# Patient Record
Sex: MALE | Race: WHITE | Employment: FULL TIME | URBAN - METROPOLITAN AREA
[De-identification: names, ages, dates, MRNs, and addresses within clinical notes are randomized per-mention and may not be internally consistent; named-entity substitution may affect disease eponyms.]

---

## 2022-02-15 ENCOUNTER — HOSPITAL ENCOUNTER (OUTPATIENT)
Age: 47
Discharge: HOME | End: 2022-02-15

## 2022-02-15 ENCOUNTER — HOSPITAL ENCOUNTER (OUTPATIENT)
Dept: GENERAL RADIOLOGY | Age: 47
Discharge: HOME | End: 2022-02-17

## 2022-02-15 DIAGNOSIS — T14.90XA INJURY: ICD-10-CM

## 2022-02-15 PROCEDURE — 73502 X-RAY EXAM HIP UNI 2-3 VIEWS: CPT

## 2022-03-04 ENCOUNTER — HOSPITAL ENCOUNTER (OUTPATIENT)
Dept: PHYSICAL THERAPY | Facility: CLINIC | Age: 47
Setting detail: THERAPIES SERIES
Discharge: HOME | End: 2022-03-04
Payer: COMMERCIAL

## 2022-03-04 PROCEDURE — 97110 THERAPEUTIC EXERCISES: CPT

## 2022-03-04 PROCEDURE — 97161 PT EVAL LOW COMPLEX 20 MIN: CPT

## 2022-03-04 NOTE — CONSULTS
[] [facility]) - [facility] &  Therapy  [address]  [phone]  F: [phone] [x] [address]  [place] [address]   [address]  P: [phone]  F: [phone] [] [address] &  Therapy  [address]  P: [phone]  F: [phone] [] [address]  P: [phone]  F: [phone] [] [address]  [facility]   [address]   [place]: [phone]  F: [phone]    Physical Therapy Lower Extremity Evaluation    Date:  3/4/2022  Patient: Juanito Hatfield  : 1975  MRN: [id number]  Physician: DAVID Thompson- CNP   Insurance:  (9v approved -3/28)  Medical Diagnosis: S70.02XA - Contusion of left hip, initial encounter  Rehab Codes: M25.55, M62.81, R26.89, M79.652  Onset date: 2/10/22   Next Dr's appt. : 3/15/22    Subjective:   CC/HPI: 42 y.o. male presents to physical therapy with complaint of L hip pain. Pt recalls falling on 2/10/22 while at work. Pt reports he was walking down an icy hill when he slipped and fell onto his L side landing on his work clip. Pt describes soreness afterwards but was able to continue working for 1-2 more days. Pain began gradually worsening. Pain worsens with sitting and with lying on L side especially driving and crossing legs. Pain is described as constant dull pressure that makes him \"nauseous\". Pain lessens upon standing and walking around as well as keeping leg straight Pain initially to L lateral hip, and now is posterior to hip with some burning down his lateral thigh Pt denies history of injury to L hip.  Pt has been performing some exercises, steroids with bad reaction to it so he wasn't able to take it, ibuprofen helps temporary, ice without relief, does not notice a difference with exercises. Pt denies numbness or tingling. Pt denies buckling or instability to L hip. Pt is currently on light duty for work- sitting in a car and crossing legs.      PMHx: [x] Unremarkable    [x] Other: lumbar fracture 2005              [x] Refer to full medical chart  In EPIC     Tests: [x] X-Ray: No acute osseous abnormality of the left hip    [] MRI:    [] Other:     Comorbidities:   [] Obesity [] Dialysis  [x] N/A   [] Asthma/COPD [] Dementia [] Other:   [] Stroke [] Sleep apnea [] Other:   [] Vascular disease [] Rheumatic disease [] Other:       Medications:  [x] Refer to full medical record [] None [] Other:  Allergies:       [] Refer to full medical record [x] None [] Other:    ADL/IADL Previous level of function Current level of function Who currently assists the patient with task Comments    Bathing  [x] Independent  [] Assist [x] Independent  [] Assist     Dress/grooming [x] Independent  [] Assist [x] Independent  [] Assist     Transfer/mobility [x] Independent  [] Assist [x] Independent  [] Assist     Feeding [x] Independent  [] Assist [x] Independent  [] Assist     Toileting [x] Independent  [] Assist [x] Independent  [] Assist     Driving [x] Independent  [] Assist [x] Independent  [] Assist     Housekeeping [x] Independent  [] Assist [x] Modified Independent  [] Assist  Minimizes heavier household chores    Grocery shop/meal prep [x] Independent  [] Assist [x] Independent  [] Assist       Gait Prior level of function Current level of function    [x] Independent  [] Assist [x] Independent  [] Assist   Device: [x] Independent [x] Independent    [] Straight Cane [] Quad cane [] Straight Cane [] Quad cane    [] Standard walker [] Rolling walker   [] 4 wheeled walker [] Standard walker [] Rolling walker   [] 4 wheeled walker    [] Wheelchair [] Wheelchair     Employer [employer]-    Job Status []  Normal duty   [x] Light duty   [] Off due to condition    []  Retired   [] Not employed [] Disability  [] Other:  [x]  Return to work: tbd    Work activities/duties climb ladders, standing, lifting and carrying heavier objects up to 100#   Recreational Activities  N/A         Pain present? Yes   Location Lateral/posterior hip    Pain Rating currently Up to 5/10   Pain at worse 6-7/10   Pain at best 1-2/10   Description of pain Dull, burning    Altered Sensation None    What makes it worse sitting prolonged, driving, heavier lifting    What makes it better Keeping leg straight, standing, walking    Symptom progression Gradually worsening    Sleep Disturbed when lying on L side              Objective:    STRENGTH    Left Right   Hip Flex 4-* 5   Ext 3+* 4+   ABD 3+* 4+   ADD     Knee Flex 4 5   Ext 4- 5   Ankle DF 4+ 5   PF 4+ 5   INV     EVER              ROM  ° A/P    Left Right   Hip Flex 105  57 knee straight  115  Knee bent   65 knee straight   Ext 5 15   ER 18 40   IR 10 35   ABD     ADD     Knee Flex     Ext     Ankle DF     PF     INV     EVER            TESTS (+/-) Left Right Not Tested   Ant. Drawer   [x]   Post. Drawer   [x]   Lachmans   [x]   Valgus Stress   [x]   Varus Stress   [x]   Lauras   [x]   Apleys Comp.    [x]   Apleys Dist.   [x]   Hip Scouring +GT  []   VICENTAs +  []   Piriformis +  []   Patricias -  []   Talor Tilt   [x]   Pat-Fem Grind   [x]       OBSERVATION No Deficit Deficit Not Tested Comments   Posture       Lateral Shift [] [x] [] Shifted towards R- seated and standing   Scoliosis [x] [] []    Iliac Crest [x] [] []    PSIS [x] [] []    ASIS [x] [] []    Genu Valgus [x] [] []    Genu Varus [x] [] []    Genu Recurvatum [x] [] []    Pronation [x] [] []    Supination [x] [] []    Leg Length Discrp [] [x] [] LLE shorter than RLE   Slumped Sitting [x] [] []    Palpation [] [x] [] Increased tenderness to L GT, L glute max and min distal insertion, L piriformis   Sensation [x] [] []    Edema [x] [] []    Neurological [x] [] []    Patellar Mobility [x] [] [x]    Patellar Orientation [] [] [x]    Gait [] [x] [] Analysis: slight antalgic gait pattern, decreased stance time on LLE, lateral shift to RLE         FUNCTION Normal Difficult Unable   Sitting [] [x] []   Standing [x] [] []   Ambulation [x] [] []   Groom/Dress [x] [] []   Lift/Carry [] [x] []   Stairs [] [x] []   Bending [] [x] []   Squat [] [x] []   Kneel [] [x] []         BALANCE/PROPRIOCEPTION              [] Not tested   Single leg stance       R                     L                                PAIN   Eyes open              0            Sec.         0         Sec                 . []    Eyes closed                          Sec. Sec                  .[]       *unable to put full weight bearing through LLE         FUNCTIONAL TESTS PAIN NO PAIN COMMENTS   Step Test 4 [] []    6 [] []    8 [] []    Squat [x] [] Weight shifted primarily to RLE, decreased range of motion, excessive anterior tibial translation           Flexibility Normal Left tight Right tight   Hip flexor [] [x] []   quad [] [] []   HS [] [x] []   piriformis [] [x] []   ITB [] [] []   gastroc [] [] []   Soleus  [] [] []    [] [] []    [] [] []        Somatic Dysfunctions Normal Deficit Details   Cervical   [x] []    Thoracic   [x] []    Rib   [x] []    Pelvis   [x] []    Lumbar [x] []    SI   [x] []        Functional Test: LEFI Score: 42/80 or 47.5% functionally impaired       Comments:      Assessment:    42 y.o. male presents to physical therapy with L hip pain, reduced L hip AROM, impaired LLE flexibility, impaired L hip strength, impaired gait and balance. These impairments are limiting the patient's ability to perform tasks that include sitting prolonged, crossing legs, driving, sleeping, and heavier lifting. Patient is employed as a  for [employer] involving standing, walking, carrying heavy equipment, climbing ladders, and driving.  Patient has been placed on light duty due to condition with return to work date to be determined. Patient would benefit from skilled physical therapy services in order to: reduce L hip pain, restore L hip mobility, and improve L hip strength to improve gait and balance and restore patient's independence with all daily and work related activities. Problems:    [x] ? Pain: up to 7/10 pain L hip   [x] ? ROM: limited L hip ROM globally  [x] ? Strength: impaired L hip strength globally   [x] ? Function: impaired function indicated by LEFI score of 47.5%  [x] Other: impaired gait      LTG: (to be met in 9 treatments)  1. Improve score on assessment tool LEFI from 47.5% impaired to 25% impaired or less. 2. Reduce L hip pain levels to 3/10 or less to ease difficulty with all daily activities including work. 3. Pt to increase L hip AROM globally to within 5 degrees of R hip to ease difficulty with all daily activities. 4. Pt to demonstrate ability to ambulate at least 300 feet with improved gait mechanics and without complaint of pain. 5. Pt to demonstrate ability to ascend/descend a full flight of steps with reciprocal stepping mechanics without complaint of increased hip pain. 6. Pt to demonstrate ability to lift at least 20# from ground to waist level with appropriate technique and without pain. 7. Pt to be independent and compliant with HEP with ability to demonstrate all exercises with minimal cueing for appropriate technique. Patient goals: reduce pain, restore function    Rehab Potential:  [x] Good  [] Fair  [] Poor   Suggested Professional Referral:  [x] No  [] Yes:  Barriers to Goal Achievement[de-identified]  [x] No  [] Yes:  Domestic Concerns:  [x] No  [] Yes:    Pt. Education:  [x] Plans/Goals, Risks/Benefits discussed  [x] Home exercise program    Method of Education: [x] Verbal  [x] Demo  [x] Written  Access Code: [email or web address]  URL: [email or web address]. com/  Date: 03/04/2022  Prepared by: Yeimi Lawlre    Exercises  Supine Heel Slide - 1 x daily - 7 x weekly - 2 sets - 10 reps  Supine Gluteal Sets - 1 x daily - 7 x weekly - 2 sets - 10 reps  Supine Lower Trunk Rotation - 1 x daily - 7 x weekly - 2 sets - 10 reps  Supine Hip Adduction Isometric with Ball - 1 x daily - 7 x weekly - 15 reps - 5\" hold    Comprehension of Education:  [x] Verbalizes understanding. [x] Demonstrates understanding. [x] Needs Review. [] Demonstrates/verbalizes understanding of HEP/Ed previously given. Treatment Plan:  [x] Therapeutic Exercise   08533  [] Iontophoresis: 4 mg/mL Dexamethasone Sodium Phosphate  mAmin  41293   [x] Therapeutic Activity  50777 [x] Vasopneumatic cold with compression  32386    [] Gait Training   39875 [] Ultrasound   73743   [] Neuromuscular Re-education  66145 [] Electrical Stimulation Unattended  08559   [x] Manual Therapy  74312 [] Electrical Stimulation Attended  76646   [x] Instruction in HEP  [] Lumbar/Cervical Traction  20106   [] Aquatic Therapy   07563 [x] Cold/hotpack    [] Massage   67320      [] Dry Needling, 1 or 2 muscles  56627   [] Biofeedback, first 15 minutes   77968  [] Biofeedback, additional 15 minutes   35736 [] Dry Needling, 3 or more muscles  47589            [x]  Medication allergies reviewed for use of    Dexamethasone Sodium Phosphate 4mg/ml     with iontophoresis treatments. Pt is not allergic.     Frequency:  3 x/week for 9 visits per referral     Todays Treatment:    Exercises:  Exercise     Reps/ Time Weight/ Level Comments         Heel slides 10x     Supine glute sets 10x5\"     Hip adduction iso 10x5\"     LTR 5x5\"ea                                                                 Other:    Specific Instructions for next treatment: restore hip ROM, LLE flexibility, progress hip strengthening as able, trial gentle MFR to glutes/ITB, gentle L hip distraction     Evaluation Complexity:  History (Personal factors, comorbidities) [] 0 [x] 1-2 [] 3+   Exam (limitations, restrictions) [] 1-2 [] 3 [x] 4+   Clinical presentation (progression) [] Stable [x] Evolving  [] Unstable   Decision Making [x] Low [] Moderate [] High    [x] Low Complexity [] Moderate Complexity [] High Complexity       Treatment Charges: Mins Units   [x] Evaluation       [x]  Low       []  Moderate       []  High 35 1   []  Modalities     [x]  Ther Exercise 10 1   []  Manual Therapy     []  Ther Activities     []  Aquatics     []  Vasocompression     []  Other       TOTAL TREATMENT TIME: 45 minutes     Time in: 1:30 pm    Time Out: 2:15 pm    Electronically signed by: Michael Love PT        Physician Signature:________________________________Date:__________________  By signing above or cosigning this note, I have reviewed this plan of care and certify a need for medically necessary rehabilitation services.      *PLEASE SIGN ABOVE AND FAX BACK ALL PAGES*

## 2022-03-08 ENCOUNTER — HOSPITAL ENCOUNTER (OUTPATIENT)
Dept: PHYSICAL THERAPY | Facility: CLINIC | Age: 47
Setting detail: THERAPIES SERIES
Discharge: HOME | End: 2022-03-08
Payer: COMMERCIAL

## 2022-03-08 PROCEDURE — 97016 VASOPNEUMATIC DEVICE THERAPY: CPT

## 2022-03-08 PROCEDURE — 97110 THERAPEUTIC EXERCISES: CPT

## 2022-03-08 NOTE — FLOWSHEET NOTE
[] [address]  [address]  [phone]  F: [phone] [x] [address]  [facility]   [address]  P: [phone]  F: [phone] [] [address]  [facility]  P: [phone]  F: [phone] [] [address]  P: [phone]  F: [phone] [] [address]  [facility]   [address]   [place]: [phone]  F: [phone]      Physical Therapy Daily Treatment Note    Date:  3/8/2022  Patient Name:  Zenia Carter    :  1975  MRN: [id number]  Physician: JUANCARLOS Koehler CNP                                Insurance:  (9v approved -3/28)  Medical Diagnosis: S70.02XA - Contusion of left hip, initial encounter                    Rehab Codes: M25.55, M62.81, R26.89, M79.652  Onset date: 2/10/22                           Next Dr's appt. : 3/15/22  Visit# / total visits:     Cancels/No Shows: 0    Subjective:    Pain:  [x] Yes  [] No Location: L hip  Pain Rating: (0-10 scale) 2/10  Pain altered Tx:  [] No  [x] Yes  Action: tolerance to exercise is limited    Comments: Pt reports no changes. Was sore following appt on Friday. Sitting in a car is most bothersome.      Objective:  Modalities: vasocompression- 15 min after exercise, mod compression, 34 degrees  Precautions:  Exercises: Access Code: [email or web address]   Exercise       Reps/ Time Weight/ Level Comments   True bike  5' Lv 1 Warm up- back support reclined         Heel slides 10x  5x    slide board   Supine glute sets 10x5\"       Hip adduction iso 10x5\"   Submaximal contraction    LTR 5x5\"ea       SLR 5x  A Painful    hook lying hip abd 10x [place]                Side lying hip abd 10x A     Clamshells  10x A     Reverse clamshells ------   within 5 degrees of R hip to ease difficulty with all daily activities. 4. Pt to demonstrate ability to ambulate at least 300 feet with improved gait mechanics and without complaint of pain. 5. Pt to demonstrate ability to ascend/descend a full flight of steps with reciprocal stepping mechanics without complaint of increased hip pain. 6. Pt to demonstrate ability to lift at least 20# from ground to waist level with appropriate technique and without pain. 7. Pt to be independent and compliant with HEP with ability to demonstrate all exercises with minimal cueing for appropriate technique.                     Patient goals: reduce pain, restore function    Pt. Education:  [x] Yes  [] No  [x] Reviewed Prior HEP/Ed  Method of Education: [x] Verbal- suggested ice massage over L greater trochanter and instructed in application  [x] Demo  [] Written  Access Code: Bianca Schumacher  URL: [email or web address]  Date: 03/04/2022  Prepared by: Huy López     Exercises  Supine Heel Slide - 1 x daily - 7 x weekly - 2 sets - 10 reps  Supine Gluteal Sets - 1 x daily - 7 x weekly - 2 sets - 10 reps  Supine Lower Trunk Rotation - 1 x daily - 7 x weekly - 2 sets - 10 reps  Supine Hip Adduction Isometric with Ball - 1 x daily - 7 x weekly - 15 reps - 5\" hold  Comprehension of Education:  [x] Verbalizes understanding. [x] Demonstrates understanding. [x] Needs review. [x] Demonstrates/verbalizes HEP/Ed previously given. Plan: [x] Continue current frequency toward long and short term goals.     [x] Specific Instructions for subsequent treatments: restore hip ROM, LLE flexibility, progress hip strengthening as able, trial gentle MFR to glutes/ITB, gentle L hip distraction       Time In: 3:03pm           Time Out: 3:53pm    Electronically signed by:  Francois Licea PTA

## 2022-03-10 ENCOUNTER — HOSPITAL ENCOUNTER (OUTPATIENT)
Dept: PHYSICAL THERAPY | Facility: CLINIC | Age: 47
Setting detail: THERAPIES SERIES
Discharge: HOME | End: 2022-03-10
Payer: COMMERCIAL

## 2022-03-10 PROCEDURE — 97110 THERAPEUTIC EXERCISES: CPT

## 2022-03-10 PROCEDURE — 97140 MANUAL THERAPY 1/> REGIONS: CPT

## 2022-03-10 PROCEDURE — 97016 VASOPNEUMATIC DEVICE THERAPY: CPT

## 2022-03-10 NOTE — FLOWSHEET NOTE
[] [facility]) - [facility] &  Therapy  [address]  [phone]  F: [phone] [x] [address]  [place] [address]   [address]  P: [phone]  F: [phone] [] [place]  [address]  P: [phone]  F: [phone] [] [address]  P: [phone]  F: [phone] [] [address]  [facility]   [address]   [place]: [phone]  F: [phone]      Physical Therapy Daily Treatment Note    Date:  3/10/2022  Patient Name:  Gifty Haider    :  1975  MRN: [id number]  Physician: JUANCARLOS Parson CNP                                Insurance:  (9v approved -3/28)  Medical Diagnosis: S70.02XA - Contusion of left hip, initial encounter                    Rehab Codes: M25.55, M62.81, R26.89, M79.652  Onset date: 2/10/22                           Next Dr's appt. : 3/15/22  Visit# / total visits: 3/9    Cancels/No Shows: 0    Subjective:    Pain:  [x] Yes  [] No Location: L hip  Pain Rating: (0-10 scale) 7/10  Pain altered Tx:  [] No  [x] Yes  Action: tolerance to exercise is limited    Comments: Pt arrives with increased soreness this date that started after sitting in the car. Pt notes that he was pretty sore after last session as well but felt okay during. Pt also has noticed increased popping at lateral aspect of L knee and numbness/tingling at dorsum of foot at base of toes primarily between 1-2 toe.      Objective:  Modalities: vasocompression- 15 min after exercise, mod compression, 34 degrees  Precautions:  Exercises: Access Code: [email or web address]   Exercise       Reps/ Time Weight/ Level Comments   True bike  5' Lv 1 Warm up- back support reclined         Heel slides 10x  5x    slide board   Supine glute sets 10x5\"     Hip adduction iso 10x5\"   Submaximal contraction    LTR 5x5\"ea       SLR 5x  A Painful (held)   hook lying hip abd 10x Wheeler       ITB stretch 3x20\"  strap  Added 3/10         Side lying hip abd 10x A     Clamshells  10x A     Reverse clamshells ------   Significant increase in pain with attempt             Prone          HS curls  10x A     Hip ext 10x A     Other: manual: gentle MFR via hypervolt L1, cushion attachment to glute musculature, ITB, hamstring avoiding trigger points; light  to proximal hamstring insertion        Treatment Charges: Mins Units Time in/out   []  Modalities      [x]  Ther Exercise 18 1 3:15-3:33pm   [x]  Manual Therapy 10 1 3:05- 3:15pm   []  Ther Activities      []  Aquatics      [x]  Vasocompression 15 1 3:33-3:48pm   []  Other      Total Treatment time 40 3 3:05-3:48pm   5' on bike not billed    Assessment: [] Progressing toward goals. [] No change. [x] Other: Initiated session with manual via hypervolt to ITB, hamstring, glutes as able to reduce global tissue tension throughout L hip avoiding direct contact over trigger points. Followed by gentle MFR via  to proximal hamstring insertion and distal glute insertion. Pt reports some pain with manual but described as tolerable. Palpable tissue tension and adhesions present during manual. Continued with previous exercises monitoring for increased symptoms. Held exercises that provoked increase symptoms. Added ITB stretch d/t tissue tension and pt description of popping at lateral knee over distal ITB insertion with fair tolerance. Ended again with vaso to L hip to reduce pain and inflammation post exercise. [x] Patient would continue to benefit from skilled physical therapy services in order to: reduce L hip pain, restore L hip mobility, and improve L hip strength to improve gait and balance and restore patient's independence with all daily and work related activities.        At Eval:  Functional Test: LEFI Score: 42/80 or 47.5% functionally impaired       Problems:    [x]? ? Pain: up to 7/10 pain L hip   [x]? ? ROM: limited L hip ROM globally  [x]? ? Strength: impaired L hip strength globally   [x]? ? Function: impaired function indicated by LEFI score of 47.5%  [x]? Other: impaired gait        LTG: (to be met in 9 treatments)  1. Improve score on assessment tool LEFI from 47.5% impaired to 25% impaired or less. 2. Reduce L hip pain levels to 3/10 or less to ease difficulty with all daily activities including work. 3. Pt to increase L hip AROM globally to within 5 degrees of R hip to ease difficulty with all daily activities. 4. Pt to demonstrate ability to ambulate at least 300 feet with improved gait mechanics and without complaint of pain. 5. Pt to demonstrate ability to ascend/descend a full flight of steps with reciprocal stepping mechanics without complaint of increased hip pain. 6. Pt to demonstrate ability to lift at least 20# from ground to waist level with appropriate technique and without pain. 7. Pt to be independent and compliant with HEP with ability to demonstrate all exercises with minimal cueing for appropriate technique.                     Patient goals: reduce pain, restore function    Pt. Education:  [x] Yes  [] No  [x] Reviewed Prior HEP/Ed  Method of Education: [x] Verbal- suggested ice massage over L greater trochanter and instructed in application  [x] Demo  [] Written  Access Code: Lilly Bowser  URL: [email or web address]  Date: 03/04/2022  Prepared by: Mihir Gauthier     Exercises  Supine Heel Slide - 1 x daily - 7 x weekly - 2 sets - 10 reps  Supine Gluteal Sets - 1 x daily - 7 x weekly - 2 sets - 10 reps  Supine Lower Trunk Rotation - 1 x daily - 7 x weekly - 2 sets - 10 reps  Supine Hip Adduction Isometric with Ball - 1 x daily - 7 x weekly - 15 reps - 5\" hold  Comprehension of Education:  [x] Verbalizes understanding. [x] Demonstrates understanding.   [x] Needs review. [x] Demonstrates/verbalizes HEP/Ed previously given. Plan: [x] Continue current frequency toward long and short term goals.     [x] Specific Instructions for subsequent treatments: restore hip ROM, LLE flexibility, progress hip strengthening as able, trial gentle MFR to glutes/ITB, gentle L hip distraction       Time In: 3:00 pm           Time Out: 3:48 pm    Electronically signed by:  Rafael Rodriguez PT

## 2022-03-11 ENCOUNTER — HOSPITAL ENCOUNTER (OUTPATIENT)
Dept: PHYSICAL THERAPY | Facility: CLINIC | Age: 47
Setting detail: THERAPIES SERIES
Discharge: HOME | End: 2022-03-11
Payer: COMMERCIAL

## 2022-03-11 PROCEDURE — 97110 THERAPEUTIC EXERCISES: CPT

## 2022-03-11 PROCEDURE — 97016 VASOPNEUMATIC DEVICE THERAPY: CPT

## 2022-03-11 NOTE — FLOWSHEET NOTE
[] [facility]) - [facility] CENTER &  Therapy  [address]  [phone]  F: [phone] [x] [address]  [facility]   [address]  P: [phone]  F: [phone] [] [address]  [address]  P: [phone]  F: [phone] [] [address]  P: [phone]  F: [phone] [] [address]  [facility]   [address]   [place]: [phone]  F: [phone]      Physical Therapy Daily Treatment Note    Date:  3/11/2022  Patient Name:  Harris Medina    :  1975  MRN: [id number]  Physician: JUANCARLOS Xiong CNP                                Insurance:  (9v approved -3/28)  Medical Diagnosis: S70.02XA - Contusion of left hip, initial encounter                    Rehab Codes: M25.55, M62.81, R26.89, M79.652  Onset date: 2/10/22                           Next 's appt. : 3/15/22  Visit# / total visits: 4/9    Cancels/No Shows: 0    Subjective:    Pain:  [x] Yes  [] No Location: L hip  Pain Rating: (0-10 scale) 4-5/10  Pain altered Tx:  [] No  [x] Yes  Action: tolerance to exercise is limited    Comments: Pt arrives noting that he had increased soreness following previous session however felt better upon waking up this morning. Continues to describe ache in hip and deep pain that makes hip nauseous.      Objective:  Modalities: vasocompression- 15 min after exercise, mod compression, 34 degrees  Precautions:  Exercises: Access Code: [email or web address]   Exercise       Reps/ Time Weight/ Level Comments   True bike  5' Lv 1 Warm up- back support reclined               PROM  x  All planes    Heel slides 10x  5x    slide board   Supine glute sets 10x5\"       Hip adduction iso 7x5\"   Submaximal contraction held after 7 d/t increased pain    PPT 5x5\"  Added 3/11   LTR 5x5\"ea       SLR 10x  A    hook lying hip abd 10x lime  inc resistance     ITB stretch 3x20\"  strap  Added 3/10         Side lying hip abd 10x A     Clamshells  10x A     Reverse clamshells ------   Significant increase in pain with attempt             Prone          HS curls  10x A     Hip ext 10x A     Other: manual: gentle MFR via hypervolt L1, cushion attachment to glute musculature, ITB, hamstring avoiding trigger points; light  to proximal hamstring insertion (held 3/11)  K-taping to L hip for edema/effusion         Treatment Charges: Mins Units Time in/out   []  Modalities      [x]  Ther Exercise 28 2 3:05 pm- 3:33 pm    []  Manual Therapy      []  Ther Activities      []  Aquatics      [x]  Vasocompression 15 1 3:33 pm - 3:48 pm   []  Other      Total Treatment time 43 3 3:05 - 3:48 pm   5' on bike not billed    Assessment: [] Progressing toward goals. [] No change. [x] Other:  Continued with recumbent bike warm up followed by Ramez Lopez for edema at L hip. Continued with charted exercises adding PROM, PPT, and increasing repetitions of SLR. Improved tolerance to exercise this date with complaint of pain primarily with active hip adduction or while hip is in adducted position including SL positioning, otherwise pt is able to complete all exercises with minimal complaint of increased pain. Held manual this date to assess response to [facility] and exercise. Ended again with vaso to reduce soreness and effusion at L hip. [x] Patient would continue to benefit from skilled physical therapy services in order to: reduce L hip pain, restore L hip mobility, and improve L hip strength to improve gait and balance and restore patient's independence with all daily and work related activities. At Eval:  Functional Test: LEFI Score: 42/80 or 47.5% functionally impaired       Problems:    [x]? ? Pain: up to 7/10 pain L hip   [x]? ? ROM: limited L hip ROM globally  [x]? ?  Strength: impaired L hip strength globally   [x]? ? Function: impaired function indicated by LEFI score of 47.5%  [x]? Other: impaired gait        LTG: (to be met in 9 treatments)  1. Improve score on assessment tool LEFI from 47.5% impaired to 25% impaired or less. 2. Reduce L hip pain levels to 3/10 or less to ease difficulty with all daily activities including work. 3. Pt to increase L hip AROM globally to within 5 degrees of R hip to ease difficulty with all daily activities. 4. Pt to demonstrate ability to ambulate at least 300 feet with improved gait mechanics and without complaint of pain. 5. Pt to demonstrate ability to ascend/descend a full flight of steps with reciprocal stepping mechanics without complaint of increased hip pain. 6. Pt to demonstrate ability to lift at least 20# from ground to waist level with appropriate technique and without pain. 7. Pt to be independent and compliant with HEP with ability to demonstrate all exercises with minimal cueing for appropriate technique.                     Patient goals: reduce pain, restore function    Pt. Education:  [x] Yes  [] No  [x] Reviewed Prior HEP/Ed  Method of Education: [x] Verbal- suggested ice massage over L greater trochanter and instructed in application  [x] Demo  [] Written  Access Code: Sanjeev Pereira  URL: [email or web address]  Date: 03/04/2022  Prepared by: Mango Ceiba     Exercises  Supine Heel Slide - 1 x daily - 7 x weekly - 2 sets - 10 reps  Supine Gluteal Sets - 1 x daily - 7 x weekly - 2 sets - 10 reps  Supine Lower Trunk Rotation - 1 x daily - 7 x weekly - 2 sets - 10 reps  Supine Hip Adduction Isometric with Ball - 1 x daily - 7 x weekly - 15 reps - 5\" hold  Comprehension of Education:  [] Verbalizes understanding. [] Demonstrates understanding. [] Needs review. [x] Demonstrates/verbalizes HEP/Ed previously given. Plan: [x] Continue current frequency toward long and short term goals.      [x] Specific Instructions for subsequent treatments: monitor response to [address], add gentle hip distraction (proximal)       Time In: 3:00 pm           Time Out: 3:48 pm     Electronically signed by:  Wesly Sanchez PT

## 2022-03-14 ENCOUNTER — HOSPITAL ENCOUNTER (OUTPATIENT)
Dept: PHYSICAL THERAPY | Facility: CLINIC | Age: 47
Setting detail: THERAPIES SERIES
Discharge: HOME | End: 2022-03-14
Payer: COMMERCIAL

## 2022-03-14 NOTE — FLOWSHEET NOTE
[] [facility]) [facility] &  Therapy  [address]    [phone]  F: [phone]   [x] [address]  [facility] 36   [address]  P: [phone]  F: [phone]  [] [address]  [address]  P: [phone]  F: [phone] [] [address]  P: [phone]  F: [phone]  [] [address] [facility]   [address]   [place]: [phone]  F: [phone]   [] [place] [id number] [facility] [address]  [place]: [phone]   F: [phone]     Physical Therapy Cancel/No Show note    Date: 3/14/2022  Patient: Janina Khan  : 1975  MRN: [id number]    Cancels/No Shows to date:     For today's appointment patient:    [x]  Cancelled    [] Rescheduled appointment    [] No-show     Reason given by patient:    []  Patient ill    []  Conflicting appointment    [] No transportation      [] Conflict with work    [] No reason given    [] Weather related    [] COVID-19    [] Other:      Comments:  pts dog        [x] Next appointment was confirmed    Electronically signed by: Sherrie Arteaga PTA

## 2022-03-16 ENCOUNTER — HOSPITAL ENCOUNTER (OUTPATIENT)
Dept: PHYSICAL THERAPY | Facility: CLINIC | Age: 47
Setting detail: THERAPIES SERIES
Discharge: HOME | End: 2022-03-16
Payer: COMMERCIAL

## 2022-03-16 PROCEDURE — 97016 VASOPNEUMATIC DEVICE THERAPY: CPT

## 2022-03-16 PROCEDURE — 97110 THERAPEUTIC EXERCISES: CPT

## 2022-03-16 NOTE — FLOWSHEET NOTE
[] [facility]) - [facility] CENTER &  Therapy  [address]  [phone]  F: [phone] [x] [address]  [facility]   [address]  P: [phone]  F: [phone] [] [place]  [address]  P: [phone]  F: [phone] [] [address]  P: [phone]  F: [phone] [] [address]  [facility]   [address]   [place]: [phone]  F: [phone]      Physical Therapy Daily Treatment Note    Date:  3/16/2022  Patient Name:  Debbie Manuel    :  1975  MRN: [id number]  Physician: DAVID Anderson- FAUSTINO                                Insurance:  (9v approved -3/28)  Medical Diagnosis: S70.02XA - Contusion of left hip, initial encounter                    Rehab Codes: M25.55, M62.81, R26.89, M79.652  Onset date: 2/10/22                           Next Dr's appt. : 3/15/22  Visit# / total visits:     Cancels/No Shows: 0    Subjective:    Pain:  [x] Yes  [] No Location: L hip  Pain Rating: (0-10 scale) 4-5/10  Pain altered Tx:  [] No  [x] Yes  Action: tolerance to exercise is limited    Comments: Pt reports the hip feels the same. Nothing new, feels like the pain is deep in the hip. He is starting to get some knee pops and some numbness in the foot.      Objective:  Modalities: vasocompression- 15 min after exercise, mod compression, 34 degrees  Precautions:  Exercises: Access Code: [email or web address]   Exercise       Reps/ Time Weight/ Level Comments   True bike  5' Lv 1 Warm up- back support reclined               PROM  x  All planes    Heel slides 10x  5x    slide board   Supine glute sets 10x5\"       Hip adduction iso 10x5\"   Submaximal contraction held after 7 d/t increased pain    PPT 10x5\"  Added 3/11   LTR 5x5\"je ENRIQUEZ 10x  A    hook lying hip abd 10x lime  inc resistance     ITB stretch 3x20\"  strap  Added 3/10         Side lying hip abd 10x A     Clamshells  10x A     Reverse clamshells ------   Significant increase in pain with attempt             Prone          HS curls  10x A     Hip ext 10x A     Other: manual: gentle MFR via hypervolt L1, cushion attachment to glute musculature, ITB, hamstring avoiding trigger points; light  to proximal hamstring insertion (held 3/16)  K-taping to L hip for edema/effusion         Treatment Charges: Mins Units Time in/out   []  Modalities      [x]  Ther Exercise 30 2 9:08-9:38am   []  Manual Therapy      []  Ther Activities      []  Aquatics      [x]  Vasocompression 15 1 9:38-9:53am   []  Other      Total Treatment time 45 3 9:08-9:53   5' on bike not billed    Assessment: [] Progressing toward goals. [x] No change. Pt still with same pain in the lateral hip with most exercises. Popping in the knee with prone HS curls, no pain at the knee with this. Ended with vaso-compression for pain controle. [x] Other: pt is scheduled for a MRI on Friday and F/U with  Monday morning. [x] Patient would continue to benefit from skilled physical therapy services in order to: reduce L hip pain, restore L hip mobility, and improve L hip strength to improve gait and balance and restore patient's independence with all daily and work related activities. At al:  Functional Test: LEFI Score: 42/80 or 47.5% functionally impaired       Problems:    [x]? ? Pain: up to 7/10 pain L hip   [x]? ? ROM: limited L hip ROM globally  [x]? ? Strength: impaired L hip strength globally   [x]? ? Function: impaired function indicated by LEFI score of 47.5%  [x]? Other: impaired gait        LTG: (to be met in 9 treatments)  1. Improve score on assessment tool LEFI from 47.5% impaired to 25% impaired or less.    2. Reduce L hip pain levels to 3/10 or less to ease difficulty with all daily activities including work. 3. Pt to increase L hip AROM globally to within 5 degrees of R hip to ease difficulty with all daily activities. 4. Pt to demonstrate ability to ambulate at least 300 feet with improved gait mechanics and without complaint of pain. 5. Pt to demonstrate ability to ascend/descend a full flight of steps with reciprocal stepping mechanics without complaint of increased hip pain. 6. Pt to demonstrate ability to lift at least 20# from ground to waist level with appropriate technique and without pain. 7. Pt to be independent and compliant with HEP with ability to demonstrate all exercises with minimal cueing for appropriate technique.                     Patient goals: reduce pain, restore function    Pt. Education:  [] Yes  [x] No  [] Reviewed Prior HEP/Ed  Method of Education: [] Verbal- suggested ice massage over L greater trochanter and instructed in application  [x] Demo  [] Written  Access Code: Vanna Cho: [email or web address]  Date: 03/04/2022  Prepared by: Krystina Peterson     Exercises  Supine Heel Slide - 1 x daily - 7 x weekly - 2 sets - 10 reps  Supine Gluteal Sets - 1 x daily - 7 x weekly - 2 sets - 10 reps  Supine Lower Trunk Rotation - 1 x daily - 7 x weekly - 2 sets - 10 reps  Supine Hip Adduction Isometric with Ball - 1 x daily - 7 x weekly - 15 reps - 5\" hold  Comprehension of Education:  [] Verbalizes understanding. [] Demonstrates understanding. [] Needs review. [x] Demonstrates/verbalizes HEP/Ed previously given. Plan: [x] Continue current frequency toward long and short term goals.      [x] Specific Instructions for subsequent treatments: monitor response to [address], add gentle hip distraction (proximal)       Time In: 9:03am           Time Out: 9:53am     Electronically signed by:  Ebenezer Carrera PTA

## 2022-03-18 ENCOUNTER — HOSPITAL ENCOUNTER (OUTPATIENT)
Dept: MRI IMAGING | Facility: CLINIC | Age: 47
Discharge: HOME | End: 2022-03-20
Payer: COMMERCIAL

## 2022-03-18 DIAGNOSIS — S70.02XA CONTUSION OF LEFT HIP, INITIAL ENCOUNTER: ICD-10-CM

## 2022-03-18 PROCEDURE — 73721 MRI JNT OF LWR EXTRE W/O DYE: CPT

## 2022-03-18 PROCEDURE — 72195 MRI PELVIS W/O DYE: CPT

## 2022-03-22 ENCOUNTER — HOSPITAL ENCOUNTER (OUTPATIENT)
Dept: PHYSICAL THERAPY | Facility: CLINIC | Age: 47
Setting detail: THERAPIES SERIES
Discharge: HOME | End: 2022-03-22
Payer: COMMERCIAL

## 2022-03-22 PROCEDURE — 97140 MANUAL THERAPY 1/> REGIONS: CPT

## 2022-03-22 PROCEDURE — 97110 THERAPEUTIC EXERCISES: CPT

## 2022-03-22 PROCEDURE — 97016 VASOPNEUMATIC DEVICE THERAPY: CPT

## 2022-03-22 NOTE — FLOWSHEET NOTE
[] [facility]) - [facility] CENTER &  Therapy  [address]  [phone]  F: [phone] [x] [address]  [facility]   [address]  P: [phone]  F: [phone] [] [address]  [address]  P: [phone]  F: [phone] [] [address]  P: [phone]  F: [phone] [] [address]  [facility]   [address]   [place]: [phone]  F: [phone]      Physical Therapy Daily Treatment Note    Date:  3/22/2022  Patient Name:  Gifty Haider    :  1975  MRN: [id number]  Physician: JUANCARLOS Parson CNP                                Insurance:  (9v approved -3/28)  Medical Diagnosis: S70.02XA - Contusion of left hip, initial encounter                    Rehab Codes: M25.55, M62.81, R26.89, M79.652  Onset date: 2/10/22                           Next 's appt. : 3/24/22  Visit# / total visits: 6/9    Cancels/No Shows: 0    Subjective:    Pain:  [x] Yes  [] No Location: L hip  Pain Rating: (0-10 scale) 4-5/10  Pain altered Tx:  [] No  [x] Yes  Action: tolerance to exercise is limited    Comments: Pt arrives with continued L hip pain and popping at L knee. Pt also reports numbness to dorsum of foot remains unchanged. Pt states he had his MRI and they were negative for acute injury at L hip but did show low back involvement.      Objective:  Modalities: vasocompression- 15 min after exercise, mod compression, 34 degrees  Precautions:  Exercises: Access Code: [email or web address]   Exercise       Reps/ Time Weight/ Level Comments   True bike  5' Lv 1 Warm up- back support reclined               PROM  x  All planes    Heel slides 10x  5x    slide board   Supine glute sets 10x5\"       Hip adduction iso 10x5\"   Submaximal contraction held after 7 d/t increased pain    PPT 10x5\"  Added 3/11   LTR 10x5\"ea       SLR 10x  A    hook lying hip abd 10x lime  inc resistance     ITB stretch 3x30\" strap  Added 3/10, inc hold 3/22   Figure 4 stretch 3x30\"  Added 3/22         Side lying hip abd 10x A     Clamshells  10x A     Reverse clamshells ------   Significant increase in pain with attempt             Prone         HS curls  10x A     Hip ext 10x A     Other: manual: gentle MFR via hypervolt L1, cushion attachment to glute musculature, ITB, hamstring x 8 minutes  K-taping to L hip for edema/effusion         Treatment Charges: Mins Units Time in/out   []  Modalities      [x]  Ther Exercise 23 1 4:18- 4:41 pm   [x]  Manual Therapy 8 1 4:10- 4:18 pm   []  Ther Activities      []  Aquatics      [x]  Vasocompression 15 1 4:41- 4:56 pm   []  Other      Total Treatment time 46 3 4:10-4:56 pm    5' on bike not billed    Assessment: [] Progressing toward goals. [x] No change. Initiated session with manual via hypervolt to ITB, lateral quad, and hamstring emphasizing mid to distal attachments d/t heightened sensitivity and trigger points closer to GT. Fair tolerance to manual however minimal decrease in tightness noted following. Continued with charted exercises adding figure 4 stretch to further improve hip mobility. Pt with c/o posterolateral hip discomfort throughout session with poor tolerance to exercise progressions. Ended again with vaso to L hip to reduce soreness and inflammation post exercise. [] Other:   [x] Patient would continue to benefit from skilled physical therapy services in order to: reduce L hip pain, restore L hip mobility, and improve L hip strength to improve gait and balance and restore patient's independence with all daily and work related activities. At Eval:  Functional Test: LEFI Score: 42/80 or 47.5% functionally impaired       Problems:    [x]? ? Pain: up to 7/10 pain L hip   [x]? ? ROM: limited L hip ROM globally  [x]?  ? Strength: impaired L hip strength globally   [x]? ? Function: impaired function indicated by LEFI score of 47.5%  [x]? Other: impaired gait        LTG: (to be met in 9 treatments)  1. Improve score on assessment tool LEFI from 47.5% impaired to 25% impaired or less. 2. Reduce L hip pain levels to 3/10 or less to ease difficulty with all daily activities including work. 3. Pt to increase L hip AROM globally to within 5 degrees of R hip to ease difficulty with all daily activities. 4. Pt to demonstrate ability to ambulate at least 300 feet with improved gait mechanics and without complaint of pain. 5. Pt to demonstrate ability to ascend/descend a full flight of steps with reciprocal stepping mechanics without complaint of increased hip pain. 6. Pt to demonstrate ability to lift at least 20# from ground to waist level with appropriate technique and without pain. 7. Pt to be independent and compliant with HEP with ability to demonstrate all exercises with minimal cueing for appropriate technique.                     Patient goals: reduce pain, restore function    Pt. Education:  [] Yes  [x] No  [] Reviewed Prior HEP/Ed  Method of Education: [] Verbal- suggested ice massage over L greater trochanter and instructed in application  [] Demo  [] Written  Access Code: [place]: [email or web address]/  Date: 03/04/2022  Prepared by: Marilynn Medina     Exercises  Supine Heel Slide - 1 x daily - 7 x weekly - 2 sets - 10 reps  Supine Gluteal Sets - 1 x daily - 7 x weekly - 2 sets - 10 reps  Supine Lower Trunk Rotation - 1 x daily - 7 x weekly - 2 sets - 10 reps  Supine Hip Adduction Isometric with Ball - 1 x daily - 7 x weekly - 15 reps - 5\" hold  Comprehension of Education:  [] Verbalizes understanding. [] Demonstrates understanding. [] Needs review. [] Demonstrates/verbalizes HEP/Ed previously given. Plan: [x] Continue current frequency toward long and short term goals.      [x] Specific Instructions for subsequent treatments: monitor response to manual, add gentle hip distraction (proximal)       Time In: 4:05 pm           Time Out: 4:56 pm    Electronically signed by:  Nikole Hernandez PT

## 2022-03-24 ENCOUNTER — HOSPITAL ENCOUNTER (OUTPATIENT)
Dept: PHYSICAL THERAPY | Facility: CLINIC | Age: 47
Setting detail: THERAPIES SERIES
Discharge: HOME | End: 2022-03-24
Payer: COMMERCIAL

## 2022-03-24 ENCOUNTER — OFFICE VISIT (OUTPATIENT)
Dept: ORTHOPEDIC SURGERY | Age: 47
End: 2022-03-24

## 2022-03-24 VITALS — BODY MASS INDEX: 22.43 KG/M2 | WEIGHT: 148 LBS | HEIGHT: 68 IN

## 2022-03-24 DIAGNOSIS — M70.62 GREATER TROCHANTERIC BURSITIS OF LEFT HIP: Primary | ICD-10-CM

## 2022-03-24 PROCEDURE — 99203 OFFICE O/P NEW LOW 30 MIN: CPT

## 2022-03-24 NOTE — PROGRESS NOTES
[address] [facility]  [address] [facility] [address]  Dept: [address] [place] [address] Patient      CHIEF COMPLAINT:    Chief Complaint   Patient presents with    New Patient     [facility] hip contusion        HISTORY OF PRESENT ILLNESS:    The patient is a 42 y.o. male who is being seen as a new patient for 6-week history of left hip pain following a fall onto his utility belt while at work from 2/10/2022. Patient denies hitting his head. This is a Worker's Comp. injury. Patient has been in physical therapy since the 13th attending 3 times a week with minimal improvement. Patient states that the pain is worse while seated and localized just posterior to the greater trochanter left side. Patient notes the pain radiates to his knee occasionally with occasionally popping of his IT band at the level of the knee. Patient denies numbness or weakness. Patient notes occasionally tingling in between his first and second toe of his left foot that is noticeable while seated. Patient has a history of L1 fusion in 2017. Patient denies any previous injury to the hip. In addition to physical therapy, patient is also icing and using NSAIDs occasionally. Patient initially started a dose of oral steroids however noticed facial redness and stopped, denies any anaphylactic symptoms. Explained to patient that facial redness may be a normal side effect of steroids. Patient states that the pain comes and makes him nauseous. Patient has no other complaints at this time. Past Medical History:    Past Medical History:   Diagnosis Date    Lumbar vertebral fracture ([address] 2005    L1    Shingles 1998    right thoracic area       Past Surgical History:    Past Surgical History:   Procedure Laterality Date    HAND TENDON SURGERY  1993    Right flexor tendon cyst       Current Medications:   No current outpatient medications on file.      No current facility-administered medications for this visit. Allergies:    Patient has no known allergies. Social History:   Social History     Socioeconomic History    Marital status: Single     Spouse name: Not on file    Number of children: Not on file    Years of education: Not on file    Highest education level: Not on file   Occupational History    Not on file   Tobacco Use    Smoking status: Never Smoker    Smokeless tobacco: Never Used   Substance and Sexual Activity    Alcohol use: No    Drug use: No    Sexual activity: Not on file   Other Topics Concern    Not on file   Social History Narrative    Not on file     Social Determinants of Health     Financial Resource Strain:     Difficulty of Paying Living Expenses: Not on file   Food Insecurity:     Worried About Running Out of Food in the Last Year: Not on file    Gianluca of Food in the Last Year: Not on file   Transportation Needs:     Lack of Transportation (Medical): Not on file    Lack of Transportation (Non-Medical):  Not on file   Physical Activity:     Days of Exercise per Week: Not on file    Minutes of Exercise per Session: Not on file   Stress:     Feeling of Stress : Not on file   Social Connections:     Frequency of Communication with Friends and Family: Not on file    Frequency of Social Gatherings with Friends and Family: Not on file    Attends [employer] Services: Not on file    Active Member of [address] [place] [address] [employer] or Organizations: Not on file    Attends Club or Organization Meetings: Not on file    Marital Status: Not on file   Intimate Partner Violence:     Fear of Current or Ex-Partner: Not on file    Emotionally Abused: Not on file    Physically Abused: Not on file    Sexually Abused: Not on file   Housing Stability:     Unable to Pay for Housing in the Last Year: Not on file    Number of [place] in the Last Year: Not on file    Unstable Housing in the Last Year: Not on file       Family History:  History reviewed. No pertinent family history. REVIEW OF SYSTEMS:  Review of Systems    Gen: no fever, chills, malaise  CV: no chest pain or palpitations  Resp: no cough or shortness of breath  GI: no nausea, vomiting, diarrhea, or constipation  Neuro: no seizures, vertigo, or headaches  Msk: Left hip pain  10 remaining systems reviewed and negative      PHYSICAL EXAM:  Ht 5' 7.5\" (1.715 m)   Wt 148 lb (67.1 kg)   BMI 22.84 kg/m² Body mass index is 22.84 kg/m². Physical Exam  Gen: alert and oriented, no acute distress  Psych: Appropriate affect; Appropriate knowledge base; Appropriate mood; No hallucinations  Head: normocephalic atraumatic   Chest: symmetric chest excursion  Ortho Exam  MSK:   LLE: Skin intact without swelling or ecchymoses. Peak area of tenderness just posterior to the greater trochanter. Patient with flexed hip with minimal discomfort 120 degrees. Patient tolerated VICENTA and FADIR without groin pain. Straight leg raise to 80 degrees again hamstring tension, no radiating pain. Knee ligaments grossly intact. PSIS aligned with standing flexion test.  Leg lengths equal length from medial malleolus previous films reviewed. Compartments soft. 2+ DP pulse. TA/EHL/FHL/GS motor intact. Deep and Superficial Peroneal/Saphenous/Sural/Plantar SILT. Radiology:   Previous films reviewed    ASSESSMENT:  42 y.o. male with 6-week history of left hip pain after a fall at work    PLAN:  After examined the patient discussed discussed the etiology of the pain, patient likely has left hip trochanteric bursitis. The patient was instructed to take a break from physical therapy to allow inflammation to reduce. Patient was agreeable to undergo a greater trochanteric bursitis injection, this needs to be approved by [employer] prior to receiving injection. A follow-up appointment will be made once the injection is approved.  In the meantime patient, patient will continue icing and instructed to add topical Voltaren gel with gentle massage for relief. Patient understood and agreed with the plan with all questions being answered to the patient's satisfaction at time of visit. Return Schedule follow-up once injection approved by worker's comp. No orders of the defined types were placed in this encounter. No orders of the defined types were placed in this encounter. Electronically signed by Rosi Garvin DO PGY-1 on 3/24/2022 at 9:03 AM    This note is created with the assistance of a speech recognition program.  While intending to generate a document that actually reflects the content of the visit, the document can still have some errors including those of syntax and sound a like substitutions which may escape proof reading.   In such instances, actual meaning can be extrapolated by contextual diversion

## 2022-03-24 NOTE — FLOWSHEET NOTE
[] [address]  [address]    [phone]  F: [phone]   [x] [address]  [facility]   [address]  P: [phone]  F: [phone]  [] [address]  Therapy  [address] [facility]  P: [phone]  F: [phone] [] [address]  P: [phone]  F: [phone]  [] [address]  [address] [facility] [address]   [address]   Rose Marie Daunt: [phone]  F: [phone]   [] [place] [address] [facility] [address]  Rose Marie Daunt: [phone]   F: [phone]     Physical Therapy Cancel/No Show note    Date: 3/24/2022  Patient: Michael Garcia  : 1975  MRN: [id number]    Cancels/No Shows to date: 0    For today's appointment patient:    [x]  Cancelled    [] Rescheduled appointment    [] No-show     Reason given by patient:    []  Patient ill    []  Conflicting appointment    [] No transportation      [] Conflict with work    [] No reason given    [] Weather related    [] COVID-19    [x] Other:      Comments: CX- 1 week per ortho doctor.        [] Next appointment was confirmed    Electronically signed by: Guerda Cazares PTA

## 2022-03-28 ENCOUNTER — APPOINTMENT (OUTPATIENT)
Dept: PHYSICAL THERAPY | Facility: CLINIC | Age: 47
End: 2022-03-28
Payer: COMMERCIAL

## 2022-04-25 ENCOUNTER — TELEPHONE (OUTPATIENT)
Dept: ORTHOPEDIC SURGERY | Age: 47
End: 2022-04-25

## 2022-04-25 NOTE — TELEPHONE ENCOUNTER
Patient was last seen 03/24 for left hip and has been awaiting a return call for injection approval from [facility]    Please call patient with information about his approval process  Thank you

## 2022-04-25 NOTE — TELEPHONE ENCOUNTER
Returned call to patient. Informed we have not rec'd response for injection at this time. Patient will call the offie back for follow up appointment after 5/1/2022 if we have not heard from [facility] to evaluate restrictions.

## 2022-05-02 ENCOUNTER — TELEPHONE (OUTPATIENT)
Dept: ORTHOPEDIC SURGERY | Age: 47
End: 2022-05-02

## 2022-05-02 NOTE — TELEPHONE ENCOUNTER
Called Garth for update as it is not on the [facility] portal. Hold time is 1 hour and 10 minutes.  Left call back request.

## 2022-05-02 NOTE — TELEPHONE ENCOUNTER
Pt call and stated he did get an approval from [address] [place] [address] for his left hip injection and would like someone to call him back to make sure the office received it and to get the injection appt set up- please advise and reach out to pt at [phone], he stated that his restrictions runs out on Thursday 05- and he would need the injection completed before that, thank you

## 2022-05-05 ENCOUNTER — OFFICE VISIT (OUTPATIENT)
Dept: ORTHOPEDIC SURGERY | Age: 47
End: 2022-05-05
Payer: COMMERCIAL

## 2022-05-05 VITALS — HEIGHT: 67 IN | WEIGHT: 148 LBS | BODY MASS INDEX: 23.23 KG/M2

## 2022-05-05 DIAGNOSIS — M70.62 GREATER TROCHANTERIC BURSITIS OF LEFT HIP: Primary | ICD-10-CM

## 2022-05-05 PROCEDURE — 99213 OFFICE O/P EST LOW 20 MIN: CPT | Performed by: STUDENT IN AN ORGANIZED HEALTH CARE EDUCATION/TRAINING PROGRAM

## 2022-05-05 RX ORDER — MELOXICAM 7.5 MG/1
7.5 TABLET ORAL DAILY PRN
Qty: 30 TABLET | Refills: 0 | Status: SHIPPED | OUTPATIENT
Start: 2022-05-05

## 2022-05-05 NOTE — PROGRESS NOTES
[facility]  [address] [address] [facility]note 2[facility]  Dept Phone: [phone]  Dept Fax: [phone]      Orthopaedic Clinic Follow Up      Subjective:   Date of injury: 2/10/222    Aline Law is a 41y.o. year old male who presents to the clinic today for routine follow up left greater trochanteric bursitis. Patient is here after receiving approval for a corticosteroid injection. At today's visit he states that his is having continued pain which worsens with ambulation. He notes that his pain has not significantly improved since the initial injury. Denies any new falls or injuries. Patient is currently on light duty at work. Review of Systems  Gen: no fever, chills, malaise  CV: no chest pain or palpitations  Resp: no cough or shortness of breath  GI: no nausea, vomiting, diarrhea, or constipation  Neuro: no seizures, vertigo, or headache  Msk: left hip pain  10 remaining systems reviewed and negative    Objective : There were no vitals filed for this visit. Body mass index is 23.18 kg/m². General: No acute distress, resting comfortably in the clinic  Neuro: alert. oriented  Eyes: Extra-ocular muscles intact  Pulm: Respirations unlabored and regular. Skin: warm, well perfused  Psych:   Patient has good fund of knowledge and displays understanding of exam, diagnosis, and plan. MSK:  LLE: Patient has point tenderness over the tip of the greater trochanter. There is no significant swelling. He has full hip range of motion without pain with increased adduction and internal rotation. TA/EHL/FHL/GS motor intact. Deep and Superficial Peroneal/Saphenous/Sural/Plantar SILT. Radiology:  No new radiographic images taken at today's visit. Assessment:   41y.o. year old male with left greater trochanteric bursitis  Plan:   And a discussion with the patient today about continued conservative management of his left greater trochanteric bursitis.   We are going to attempt to change his anti-inflammatory. He was instructed to discontinue ibuprofen and start taking meloxicam 7.5 mg daily for the next 30 days. He was also given a cortisone injection into the point of maximal tenderness over the greater trochanter. See procedure note below for details. 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 He will continue with light duty until follow-up in 3 to 4 weeks. All questions were addressed and patient was in agreement with this plan. Injection procedure note  The alternatives, benefits, and risks were discussed with the patient. After answering all questions to the patient's satisfaction, the patient agreed to proceed forward with injection and gave verbal consent for the procedure. With the patient's permission, appropriate anatomic landmarks were identified and the left greater trochanter bursa was prepped in a sterile fashion using alcohol and/or betadine. A 21 gauge needle was then used to inject 2cc 0.25% marcaine plain and 40mg depo medrol into the bursa. The injection was advanced without resistance confirming appropriate position. The patient tolerated the procedure well and the site was dressed with a band-aid. Patient was advised to ice the area for 15-20 minutes to relieve any injection site related pain. Patient was advised to contact nurse if area becomes swollen, hot, erythematous, or painful, or to go to the emergency room after business hours. Orders Placed This Encounter   Medications    meloxicam (MOBIC) 7.5 MG tablet     Sig: Take 1 tablet by mouth daily as needed for Pain     Dispense:  30 tablet     Refill:  0          No orders of the defined types were placed in this encounter.       Electronically signed by Rosalee Montiel DO on 5/5/2022 at 8:14 AM    This note is created with the assistance of a speech recognition program.  While intending to generate a document that actually reflects the content of the visit, the document can still have some errors including those of syntax and sound a like substitutions which may escape proof reading.   In such instances, actual meaning can be extrapolated by contextual diversion

## 2022-06-02 ENCOUNTER — OFFICE VISIT (OUTPATIENT)
Dept: ORTHOPEDIC SURGERY | Age: 47
End: 2022-06-02
Payer: COMMERCIAL

## 2022-06-02 VITALS — WEIGHT: 148 LBS | BODY MASS INDEX: 23.23 KG/M2 | HEIGHT: 67 IN

## 2022-06-02 DIAGNOSIS — M70.62 GREATER TROCHANTERIC BURSITIS OF LEFT HIP: Primary | ICD-10-CM

## 2022-06-02 PROCEDURE — 99213 OFFICE O/P EST LOW 20 MIN: CPT

## 2022-06-02 NOTE — LETTER
[facility]  [address] [facility] [address] [facility]  Phone: [phone]  Fax: [phone]    Kale Purcell DO        June 2, 2022     Patient: Marga Gonzalez   YOB: 1975   Date of Visit: 6/2/2022       To Whom It May Concern: It is my medical opinion that Marga Gonzalez may return to work on 6/6/22 without restrictions. If you have any questions or concerns, please don't hesitate to call.     Sincerely,        Kale Purcell DO

## 2022-06-02 NOTE — PROGRESS NOTES
[address]  [address] [facility] [address]  Dept: [phone]  Dept Fax: [phone]        Orthopaedic Clinic Follow Up      Subjective:     Angel Solis is a 41y.o. year old male who presents to the clinic today for routine follow up for left greater trochanteric bursitis. Mechanism of injury was a fall from standing height at work on 2/10/2022. He was last in the office on 5/12/2022 where he was given a left hip greater trochanteric injection. He states he received about 90% relief. He states he will have an occasional burning sensation to the lateral hip but other than that is doing very well. He has continued light duty at work. He states he feels he is ready to get back to full duty. He denies any further trauma or increased pain. He denies any other orthopedic complaint at this time. Review of Systems  Gen: No fever, chills, malaise  CV: No chest pain or palpitations  Resp: No cough or shortness of breath  GI: No nausea, vomiting, diarrhea, or constipation  Neuro: No seizures, vertigo, or headache  Msk: Left peritrochanteric pain. 10 remaining systems reviewed and negative    Objective : There were no vitals filed for this visit. Body mass index is 23.18 kg/m². General: No acute distress, resting comfortably in the clinic  Neuro: Alert. oriented  Eyes: Extra-ocular muscles intact  Pulm: Respirations unlabored and regular. Skin: Warm, well perfused  Psych:   Patient has good fund of knowledge and displays understanding of exam, diagnosis, and plan. MSK: LLE: Skin intact. Non-tender to palpation laterally over the greater trochanter. No pain with hip internal and external range of motion. No pain with hip resistance of abduction. Negative VICENTA. Compartments soft. EHL/FHL/TA/GSC motor intact. Sensation intact to sural/saph/SPN/DPN distribution. DP/PT pulses 2+.       Radiology:    None taken in office today.     Assessment:   41y.o. year old male with left hip greater trochanteric bursitis  Plan:   -Clinically doing very well without complication.  -He is comfortable returning to work without restrictions on 6/6/2022. -Work release note was provided in office today. -He will follow-up in 3 to 4 months for recheck. If he is doing well we will release him to follow-up as needed. Electronically signed by Claudio Adrian DO PGY-1 on 6/2/2022 at 8:57 AM    This note is created with the assistance of a speech recognition program.  While intending to generate a document that actually reflects the content of the visit, the document can still have some errors including those of syntax and sound a like substitutions which may escape proof reading.   In such instances, actual meaning can be extrapolated by contextual diversion

## 2022-07-20 ENCOUNTER — OFFICE VISIT (OUTPATIENT)
Dept: ORTHOPEDIC SURGERY | Age: 47
End: 2022-07-20
Payer: COMMERCIAL

## 2022-07-20 VITALS — HEIGHT: 67 IN | WEIGHT: 148 LBS | BODY MASS INDEX: 23.23 KG/M2

## 2022-07-20 DIAGNOSIS — M70.62 GREATER TROCHANTERIC BURSITIS OF LEFT HIP: Primary | ICD-10-CM

## 2022-07-20 PROCEDURE — 99213 OFFICE O/P EST LOW 20 MIN: CPT | Performed by: STUDENT IN AN ORGANIZED HEALTH CARE EDUCATION/TRAINING PROGRAM

## 2022-07-20 NOTE — PROGRESS NOTES
soft.  EHL/FHL/TA/GSC motor intact. Sensation intact to sural/saph/SPN/DPN distribution. DP/PT pulses 2+. Radiology:  No imaging was taken at this encounter. Assessment:   41y.o. year old male with left hip greater trochanteric bursitis. Plan:     - Physical therapy  - Ice and elevation for pain and swelling  - Ibuprofen/tylenol for pain  - F/u in 4 weeks    Today the patient was seen and examined. We discussed the patient's diagnosis and etiology of his pain. We discussed treatment options and have agreed to move forward with physical therapy and Voltaren gel prn. He understands the plan and is in agreement. We will follow up with him in 4 weeks after he has completed a few physical therapy appointments. Wendy Person DO  Orthopedic Surgery Resident, PGY-1  [facility]

## 2023-07-18 PROBLEM — G89.29 CHRONIC BACK PAIN: Status: ACTIVE | Noted: 2017-02-08

## 2023-07-18 PROBLEM — M54.9 CHRONIC BACK PAIN: Status: ACTIVE | Noted: 2017-02-08

## 2023-09-09 RX ORDER — FLUTICASONE PROPIONATE 50 MCG
1 SPRAY, SUSPENSION (ML) NASAL DAILY
COMMUNITY

## 2023-09-09 RX ORDER — NAPROXEN 500 MG/1
500 TABLET ORAL 2 TIMES DAILY WITH MEALS
COMMUNITY

## 2023-09-09 RX ORDER — FEXOFENADINE HCL 180 MG/1
180 TABLET ORAL DAILY
COMMUNITY

## 2023-09-09 RX ORDER — ALBUTEROL SULFATE 90 UG/1
2 AEROSOL, METERED RESPIRATORY (INHALATION) EVERY 6 HOURS PRN
COMMUNITY

## 2023-09-09 RX ORDER — BUSPIRONE HYDROCHLORIDE 5 MG/1
5 TABLET ORAL 3 TIMES DAILY
COMMUNITY

## 2023-09-21 ENCOUNTER — OFFICE VISIT (OUTPATIENT)
Age: 48
End: 2023-09-21
Payer: COMMERCIAL

## 2023-09-21 VITALS
TEMPERATURE: 98.1 F | HEIGHT: 67 IN | HEART RATE: 84 BPM | WEIGHT: 161 LBS | RESPIRATION RATE: 16 BRPM | SYSTOLIC BLOOD PRESSURE: 121 MMHG | DIASTOLIC BLOOD PRESSURE: 80 MMHG | BODY MASS INDEX: 25.27 KG/M2

## 2023-09-21 DIAGNOSIS — R53.83 FATIGUE, UNSPECIFIED TYPE: Primary | ICD-10-CM

## 2023-09-21 DIAGNOSIS — J30.2 SEASONAL ALLERGIES: ICD-10-CM

## 2023-09-21 DIAGNOSIS — E78.2 MODERATE MIXED HYPERLIPIDEMIA NOT REQUIRING STATIN THERAPY: ICD-10-CM

## 2023-09-21 DIAGNOSIS — M51.36 DEGENERATIVE DISC DISEASE, LUMBAR: ICD-10-CM

## 2023-09-21 PROCEDURE — 99214 OFFICE O/P EST MOD 30 MIN: CPT | Performed by: FAMILY MEDICINE

## 2023-09-21 SDOH — ECONOMIC STABILITY: FOOD INSECURITY: WITHIN THE PAST 12 MONTHS, THE FOOD YOU BOUGHT JUST DIDN'T LAST AND YOU DIDN'T HAVE MONEY TO GET MORE.: NEVER TRUE

## 2023-09-21 SDOH — ECONOMIC STABILITY: FOOD INSECURITY: WITHIN THE PAST 12 MONTHS, YOU WORRIED THAT YOUR FOOD WOULD RUN OUT BEFORE YOU GOT MONEY TO BUY MORE.: NEVER TRUE

## 2023-09-21 SDOH — ECONOMIC STABILITY: INCOME INSECURITY: HOW HARD IS IT FOR YOU TO PAY FOR THE VERY BASICS LIKE FOOD, HOUSING, MEDICAL CARE, AND HEATING?: NOT HARD AT ALL

## 2023-09-21 SDOH — ECONOMIC STABILITY: HOUSING INSECURITY
IN THE LAST 12 MONTHS, WAS THERE A TIME WHEN YOU DID NOT HAVE A STEADY PLACE TO SLEEP OR SLEPT IN A SHELTER (INCLUDING NOW)?: NO

## 2023-09-21 ASSESSMENT — PATIENT HEALTH QUESTIONNAIRE - PHQ9
SUM OF ALL RESPONSES TO PHQ QUESTIONS 1-9: 0
1. LITTLE INTEREST OR PLEASURE IN DOING THINGS: 0
SUM OF ALL RESPONSES TO PHQ QUESTIONS 1-9: 0
2. FEELING DOWN, DEPRESSED OR HOPELESS: 0
SUM OF ALL RESPONSES TO PHQ9 QUESTIONS 1 & 2: 0
SUM OF ALL RESPONSES TO PHQ QUESTIONS 1-9: 0
SUM OF ALL RESPONSES TO PHQ QUESTIONS 1-9: 0

## 2023-09-21 ASSESSMENT — ENCOUNTER SYMPTOMS
CHEST TIGHTNESS: 0
COLOR CHANGE: 0
BACK PAIN: 1

## 2023-10-24 ASSESSMENT — ENCOUNTER SYMPTOMS
BACK PAIN: 1
COLOR CHANGE: 0
CHEST TIGHTNESS: 0

## 2023-10-24 NOTE — PATIENT INSTRUCTIONS
I would recommend a diet focused on vegetables, fruits, health protein that minimizes sweets, watch more than 1 portion of carbohydrates in a meal, avoid high sugar beverages and excess red meats. Maintain a healthy BMI of 20-25    Work to a lifestyle of regular a aerobic exercise = 40 minutes of moderate to intense physical activity  3-4 times a week. I would do this in conjunction with setting goals with physical therapy that you have been using for your back. Please purchase a NeilMed Sinus Rinse/ Nettipot Sinus Rinse (any brand will work) at any local store or pharmacy. Please use warm distilled water or boiled water that has cooled. Follow the instructions on the box and mix with the water. Tip your head forward over a sink and follow the instructions for rinsing your sinuses. You may add baby shampoo 1 drop to the sinus rinse if really congested. Please call if you are not improving after 2-3 days. Try over the counter steroid nasal spray like nasalcort AQ. Use as directed.

## 2023-10-24 NOTE — PROGRESS NOTES
[address] [facility]  [address] [facility] [address] [facility]  Phone: [phone]  Fax: [phone]      Date of Visit:  10/26/2023  Patient Name: Estuardo Casey. Patient :  1975     Assessment:     1. Degenerative disc disease, lumbar    2. Seasonal allergies    3. Bilateral chronic serous otitis media        Plan:    BMI was elevated today, and weight loss plan recommended is : conventional weight loss. Patient will use over-the-counter sinus recommendations below. If fatigue is not clearing, we may consider a sleep study and/or referral to ENT. Meanwhile I will fill out [employer] paperwork for disability for low back pain. Patient Instructions   I would recommend a diet focused on vegetables, fruits, health protein that minimizes sweets, watch more than 1 portion of carbohydrates in a meal, avoid high sugar beverages and excess red meats. Maintain a healthy BMI of 20-25    Work to a lifestyle of regular a aerobic exercise = 40 minutes of moderate to intense physical activity  3-4 times a week. I would do this in conjunction with setting goals with physical therapy that you have been using for your back. Please purchase a NeilMed Sinus Rinse/ Nettipot Sinus Rinse (any brand will work) at any local store or pharmacy. Please use warm distilled water or boiled water that has cooled. Follow the instructions on the box and mix with the water. Tip your head forward over a sink and follow the instructions for rinsing your sinuses. You may add baby shampoo 1 drop to the sinus rinse if really congested. Please call if you are not improving after 2-3 days. Try over the counter steroid nasal spray like nasalcort AQ. Use as directed. Requested Prescriptions      No prescriptions requested or ordered in this encounter       There are no discontinued medications.     Return in about 3 months (around 2024) for

## 2023-10-26 ENCOUNTER — OFFICE VISIT (OUTPATIENT)
Age: 48
End: 2023-10-26
Payer: COMMERCIAL

## 2023-10-26 VITALS
SYSTOLIC BLOOD PRESSURE: 122 MMHG | RESPIRATION RATE: 16 BRPM | WEIGHT: 158 LBS | HEIGHT: 67 IN | TEMPERATURE: 74 F | BODY MASS INDEX: 24.8 KG/M2 | DIASTOLIC BLOOD PRESSURE: 80 MMHG

## 2023-10-26 DIAGNOSIS — M51.36 DEGENERATIVE DISC DISEASE, LUMBAR: Primary | ICD-10-CM

## 2023-10-26 DIAGNOSIS — H65.23 BILATERAL CHRONIC SEROUS OTITIS MEDIA: ICD-10-CM

## 2023-10-26 DIAGNOSIS — J30.2 SEASONAL ALLERGIES: ICD-10-CM

## 2023-10-26 PROCEDURE — 99211 OFF/OP EST MAY X REQ PHY/QHP: CPT | Performed by: FAMILY MEDICINE

## 2023-10-26 PROCEDURE — 99214 OFFICE O/P EST MOD 30 MIN: CPT | Performed by: FAMILY MEDICINE

## 2023-10-27 ENCOUNTER — TELEPHONE (OUTPATIENT)
Age: 48
End: 2023-10-27

## 2023-10-27 DIAGNOSIS — M51.36 DEGENERATIVE DISC DISEASE, LUMBAR: Primary | ICD-10-CM

## 2023-10-27 RX ORDER — CYCLOBENZAPRINE HCL 10 MG
TABLET ORAL
Qty: 21 TABLET | Refills: 0 | Status: SHIPPED | OUTPATIENT
Start: 2023-10-27

## 2023-10-27 NOTE — TELEPHONE ENCOUNTER
Pt states forgot to ask you for a Flexeril refill, would like that sent in if possible-  Picking up letter next week-thank you!

## 2023-10-30 ENCOUNTER — TELEPHONE (OUTPATIENT)
Age: 48
End: 2023-10-30

## 2023-10-30 DIAGNOSIS — H66.003 NON-RECURRENT ACUTE SUPPURATIVE OTITIS MEDIA OF BOTH EARS WITHOUT SPONTANEOUS RUPTURE OF TYMPANIC MEMBRANES: Primary | ICD-10-CM

## 2023-10-30 RX ORDER — AMOXICILLIN 875 MG/1
875 TABLET, COATED ORAL 2 TIMES DAILY
Qty: 20 TABLET | Refills: 0 | Status: SHIPPED | OUTPATIENT
Start: 2023-10-30 | End: 2023-11-09

## 2023-10-30 NOTE — TELEPHONE ENCOUNTER
Ear pain and pressure has increased since his visit on Thursday. Asking if you will prescribe antibiotic. Please advise.     Pharm: [facility]

## 2024-04-09 NOTE — PROGRESS NOTES
[facility]  [address] [place] [address]  Phone: [phone]  Fax: [phone]      Date of Visit:  2024  Patient Name: Adams Greer Jr.   Patient :  1975     Assessment:     1. Degenerative disc disease, lumbar        Plan:    BMI was elevated today, and weight loss plan recommended is : conventional weight loss.            Patient Instructions   Watch the Pap and junk food as you have gained 20 pounds.  I would recommend a diet focused on vegetables, fruits, health protein that minimizes sweets, watch more than 1 portion of carbohydrates in a meal, avoid high sugar beverages and excess red meats.      Maintain a healthy BMI of 20-25     Work to a lifestyle of regular a aerobic exercise = 40 minutes of moderate to intense physical activity  3-4 times a week.  I would do this in conjunction with setting goals with physical therapy that you have been using for your back.       Requested Prescriptions      No prescriptions requested or ordered in this encounter       There are no discontinued medications.    Return in about 3 months (around 2024) for annual wellness visit.    Adams was given educational materials: See patient instructions. Discussed use, benefit, and side effects of prescribed medications.  Barriers to medication compliance addressed. All patient questions answered.  Pt voiced understanding.     Subjective:      HPI    Adams Greer Jr. is a 48 y.o. male with Hx of  has a past medical history of Atopic rhinitis, Chronic back pain, Chronic bronchitis (HCC), Contusion of head, Degenerative disc disease, lumbar, Deviated nasal septum, Dizziness, Fracture of L1 vertebra (HCC), GERD (gastroesophageal reflux disease), Headache, Hematuria, Hypercholesteremia, Influenza B, Inguinal hernia, right, Lumbar vertebral fracture (HCC), Lymphangitis, Patellofemoral disorder of right knee, Puncture wound, Right lumbar

## 2024-04-11 NOTE — PATIENT INSTRUCTIONS
Watch the Pap and junk food as you have gained 20 pounds.  I would recommend a diet focused on vegetables, fruits, health protein that minimizes sweets, watch more than 1 portion of carbohydrates in a meal, avoid high sugar beverages and excess red meats.      Maintain a healthy BMI of 20-25     Work to a lifestyle of regular a aerobic exercise = 40 minutes of moderate to intense physical activity  3-4 times a week.  I would do this in conjunction with setting goals with physical therapy that you have been using for your back.

## 2024-04-16 ENCOUNTER — OFFICE VISIT (OUTPATIENT)
Age: 49
End: 2024-04-16
Payer: COMMERCIAL

## 2024-04-16 VITALS
WEIGHT: 173 LBS | BODY MASS INDEX: 27.1 KG/M2 | HEART RATE: 91 BPM | RESPIRATION RATE: 16 BRPM | SYSTOLIC BLOOD PRESSURE: 115 MMHG | DIASTOLIC BLOOD PRESSURE: 85 MMHG

## 2024-04-16 DIAGNOSIS — M51.36 DEGENERATIVE DISC DISEASE, LUMBAR: Primary | ICD-10-CM

## 2024-04-16 PROCEDURE — 99213 OFFICE O/P EST LOW 20 MIN: CPT | Performed by: FAMILY MEDICINE

## 2024-04-16 PROCEDURE — 99212 OFFICE O/P EST SF 10 MIN: CPT | Performed by: FAMILY MEDICINE

## 2024-04-16 ASSESSMENT — PATIENT HEALTH QUESTIONNAIRE - PHQ9
1. LITTLE INTEREST OR PLEASURE IN DOING THINGS: NOT AT ALL
SUM OF ALL RESPONSES TO PHQ QUESTIONS 1-9: 0
2. FEELING DOWN, DEPRESSED OR HOPELESS: NOT AT ALL
SUM OF ALL RESPONSES TO PHQ QUESTIONS 1-9: 0
SUM OF ALL RESPONSES TO PHQ9 QUESTIONS 1 & 2: 0
SUM OF ALL RESPONSES TO PHQ QUESTIONS 1-9: 0
SUM OF ALL RESPONSES TO PHQ QUESTIONS 1-9: 0

## 2024-04-16 ASSESSMENT — ENCOUNTER SYMPTOMS
GASTROINTESTINAL NEGATIVE: 1
RESPIRATORY NEGATIVE: 1
EYES NEGATIVE: 1

## 2024-04-25 ENCOUNTER — TRANSCRIBE ORDERS (OUTPATIENT)
Dept: ULTRASOUND IMAGING | Age: 49
End: 2024-04-25

## 2024-04-25 DIAGNOSIS — S66.911A STRAIN OF RIGHT WRIST, INITIAL ENCOUNTER: Primary | ICD-10-CM

## 2024-07-10 DIAGNOSIS — M51.36 DEGENERATIVE DISC DISEASE, LUMBAR: ICD-10-CM

## 2024-07-10 RX ORDER — CEPHALEXIN 500 MG/1
500 CAPSULE ORAL 4 TIMES DAILY
COMMUNITY
Start: 2024-07-09 | End: 2024-07-10

## 2024-07-10 RX ORDER — CYCLOBENZAPRINE HCL 10 MG
TABLET ORAL
Qty: 21 TABLET | Refills: 1 | Status: SHIPPED | OUTPATIENT
Start: 2024-07-10

## 2024-07-10 RX ORDER — ASPIRIN 81 MG
100 TABLET, DELAYED RELEASE (ENTERIC COATED) ORAL 2 TIMES DAILY PRN
COMMUNITY
Start: 2024-07-09 | End: 2024-07-19

## 2024-07-10 RX ORDER — ACETAMINOPHEN 500 MG
1000 TABLET ORAL EVERY 8 HOURS PRN
COMMUNITY
Start: 2024-07-09 | End: 2024-07-23

## 2024-07-20 NOTE — PROGRESS NOTES
disclaims any warranty or liability for your use of this information.        Patient Education       A Healthy Lifestyle: Care Instructions  A healthy lifestyle can help you feel good, have more energy, and stay at a weight that's healthy for you. You can share a healthy lifestyle with your friends and family. And you can do it on your own.    Eat meals with your friends or family. You could try cooking together.   Plan activities with other people. Go for a walk with a friend, try a free online fitness class, or join a sports league.     Eat a variety of healthy foods. These include fruits, vegetables, whole grains, low-fat dairy, and lean protein.   Choose healthy portions of food. You can use the Nutrition Facts label on food packages as a guide.     Eat more fruits and vegetables. You could add vegetables to sandwiches or add fruit to cereal.   Drink water when you are thirsty. Limit soda, juice, and sports drinks.     Try to exercise most days. Aim for at least 2½ hours of exercise each week.   Keep moving. Work in the garden or take your dog on a walk. Use the stairs instead of the elevator.     If you use tobacco or nicotine, try to quit. Ask your doctor about programs and medicines to help you quit.   Limit alcohol. Men should have no more than 2 drinks a day. Women should have no more than 1. For some people, no alcohol is the best choice.   Follow-up care is a key part of your treatment and safety. Be sure to make and go to all appointments, and call your doctor if you are having problems. It's also a good idea to know your test results and keep a list of the medicines you take.  Where can you learn more?  Go to https://www.[employer].net/patientEd and enter U807 to learn more about \"A Healthy Lifestyle: Care Instructions.\"  Current as of: August 6, 2023  Content Version: 14.1  © 3139-7740 Healthwise, [employer].   Care instructions adapted under license by [employer]. If you have questions about a

## 2024-07-22 ENCOUNTER — OFFICE VISIT (OUTPATIENT)
Age: 49
End: 2024-07-22
Payer: COMMERCIAL

## 2024-07-22 ENCOUNTER — TELEPHONE (OUTPATIENT)
Age: 49
End: 2024-07-22

## 2024-07-22 VITALS
TEMPERATURE: 97.6 F | SYSTOLIC BLOOD PRESSURE: 145 MMHG | BODY MASS INDEX: 25.74 KG/M2 | HEIGHT: 67 IN | HEART RATE: 98 BPM | WEIGHT: 164 LBS | DIASTOLIC BLOOD PRESSURE: 93 MMHG

## 2024-07-22 DIAGNOSIS — M51.36 DEGENERATIVE DISC DISEASE, LUMBAR: ICD-10-CM

## 2024-07-22 DIAGNOSIS — J30.2 SEASONAL ALLERGIES: ICD-10-CM

## 2024-07-22 DIAGNOSIS — Z00.00 WELLNESS EXAMINATION: Primary | ICD-10-CM

## 2024-07-22 DIAGNOSIS — R03.0 ELEVATED BLOOD PRESSURE READING: ICD-10-CM

## 2024-07-22 DIAGNOSIS — E78.2 MODERATE MIXED HYPERLIPIDEMIA NOT REQUIRING STATIN THERAPY: ICD-10-CM

## 2024-07-22 DIAGNOSIS — F41.1 GENERALIZED ANXIETY DISORDER: ICD-10-CM

## 2024-07-22 PROBLEM — S66.911A MUSCLE STRAIN OF RIGHT WRIST: Status: ACTIVE | Noted: 2024-06-24

## 2024-07-22 PROCEDURE — 99396 PREV VISIT EST AGE 40-64: CPT | Performed by: FAMILY MEDICINE

## 2024-07-22 PROCEDURE — 99212 OFFICE O/P EST SF 10 MIN: CPT | Performed by: FAMILY MEDICINE

## 2024-07-22 RX ORDER — VENLAFAXINE HYDROCHLORIDE 37.5 MG/1
37.5 CAPSULE, EXTENDED RELEASE ORAL DAILY
Qty: 30 CAPSULE | Refills: 1 | Status: SHIPPED | OUTPATIENT
Start: 2024-07-22

## 2024-07-22 NOTE — PATIENT INSTRUCTIONS
Thanks for coming in today.    We will try the Wellbutrin.  At about 10 days, send me a note through [employer] or let me know how you are doing on this medication.  I usually have people start in the morning with this.  If it is working for you we will continue it for a period of 3 months.    Your weight has been gradually increasing.  Cut back on the regular pop and go back to Coke 0.    I am also including a copy of the DASH diet which is mainly fresh fruit and vegetables rather than processed foods.    Patient Education        DASH Diet: Care Instructions  Your Care Instructions     The DASH diet is an eating plan that can help lower your blood pressure. DASH stands for Dietary Approaches to Stop Hypertension. Hypertension is high blood pressure.  The DASH diet focuses on eating foods that are high in calcium, potassium, and magnesium. These nutrients can lower blood pressure. The foods that are highest in these nutrients are fruits, vegetables, low-fat dairy products, nuts, seeds, and legumes. But taking calcium, potassium, and magnesium supplements instead of eating foods that are high in those nutrients does not have the same effect. The DASH diet also includes whole grains, fish, and poultry.  The DASH diet is one of several lifestyle changes your doctor may recommend to lower your high blood pressure. Your doctor may also want you to decrease the amount of sodium in your diet. Lowering sodium while following the DASH diet can lower blood pressure even further than just the DASH diet alone.  Follow-up care is a key part of your treatment and safety. Be sure to make and go to all appointments, and call your doctor if you are having problems. It's also a good idea to know your test results and keep a list of the medicines you take.  How can you care for yourself at home?  Following the DASH diet  Eat 4 to 5 servings of fruit each day. A serving is 1 medium-sized piece of fruit, ½ cup chopped or canned fruit, 1/4

## 2024-07-22 NOTE — TELEPHONE ENCOUNTER
Patient called and stated that he was supposed to have a script for wellbutrin sent to his pharmacy today.

## 2024-07-24 ENCOUNTER — TELEPHONE (OUTPATIENT)
Age: 49
End: 2024-07-24

## 2024-07-24 NOTE — TELEPHONE ENCOUNTER
Patient asking if you could amend his letter for his veterans claim.  States only the left leg is mentioned but he has trouble with left and right leg.    Would like amended letter emailed to him.

## 2024-08-29 ENCOUNTER — TELEPHONE (OUTPATIENT)
Age: 49
End: 2024-08-29

## 2024-08-29 NOTE — TELEPHONE ENCOUNTER
I spoke with the patient, he would like to try the venlafaxine 37.5 twice a day.  He will let me know how he is doing with it next Wednesday or Thursday.

## 2024-08-29 NOTE — TELEPHONE ENCOUNTER
Patient called and stated that he is doing fine with it. Patient stated that he was taking it at night and it was helping him to sleep well but he noticed that he was needing it during the day so he switched and now it is helping him but he is having trouble sleeping, wanted to know if the solution would be to increase the dosage but he doesn't want to increase it if it is going to make him feel drowsy during the day time?

## 2024-09-04 ENCOUNTER — TELEPHONE (OUTPATIENT)
Age: 49
End: 2024-09-04

## 2024-09-04 DIAGNOSIS — F41.1 GENERALIZED ANXIETY DISORDER: ICD-10-CM

## 2024-09-04 RX ORDER — VENLAFAXINE HYDROCHLORIDE 75 MG/1
75 CAPSULE, EXTENDED RELEASE ORAL DAILY
Qty: 90 CAPSULE | Refills: 0 | Status: SHIPPED | OUTPATIENT
Start: 2024-09-04

## 2024-09-04 NOTE — TELEPHONE ENCOUNTER
Patient called and stated that he has been taking two of the Velafaxine and it seems to be working and he would like a refill.

## 2024-09-26 DIAGNOSIS — M51.36 DEGENERATIVE DISC DISEASE, LUMBAR: ICD-10-CM

## 2024-09-26 RX ORDER — CYCLOBENZAPRINE HCL 10 MG
TABLET ORAL
Qty: 21 TABLET | Refills: 1 | Status: SHIPPED | OUTPATIENT
Start: 2024-09-26

## 2024-11-06 DIAGNOSIS — F41.1 GENERALIZED ANXIETY DISORDER: ICD-10-CM

## 2024-11-06 DIAGNOSIS — M51.369 DEGENERATIVE DISC DISEASE, LUMBAR: ICD-10-CM

## 2024-11-06 RX ORDER — VENLAFAXINE HYDROCHLORIDE 75 MG/1
75 CAPSULE, EXTENDED RELEASE ORAL DAILY
Qty: 90 CAPSULE | Refills: 1 | Status: SHIPPED | OUTPATIENT
Start: 2024-11-06

## 2024-11-06 RX ORDER — CYCLOBENZAPRINE HCL 10 MG
TABLET ORAL
Qty: 21 TABLET | Refills: 1 | Status: SHIPPED | OUTPATIENT
Start: 2024-11-06

## 2024-11-06 RX ORDER — BUSPIRONE HYDROCHLORIDE 5 MG/1
5 TABLET ORAL 3 TIMES DAILY
Status: CANCELLED | OUTPATIENT
Start: 2024-11-06

## 2024-11-06 NOTE — TELEPHONE ENCOUNTER
I spoke with the patient and he is no longer using the buspirone.  He will be starting month 3 of the Effexor.  We did discuss that he could taper it after 3 months if he wishes to do so.  Instructions were given for tapering every other day for 4 or 5 doses.  However I encouraged him to make an appointment with me before the end of the year and to let me know if he was tapering the medication.

## 2025-02-10 ENCOUNTER — TELEPHONE (OUTPATIENT)
Age: 50
End: 2025-02-10

## 2025-02-10 ENCOUNTER — OFFICE VISIT (OUTPATIENT)
Age: 50
End: 2025-02-10
Payer: COMMERCIAL

## 2025-02-10 VITALS
RESPIRATION RATE: 18 BRPM | WEIGHT: 167.6 LBS | SYSTOLIC BLOOD PRESSURE: 121 MMHG | OXYGEN SATURATION: 99 % | BODY MASS INDEX: 26.25 KG/M2 | HEART RATE: 100 BPM | TEMPERATURE: 98.2 F | DIASTOLIC BLOOD PRESSURE: 75 MMHG

## 2025-02-10 DIAGNOSIS — J10.1 INFLUENZA A: Primary | ICD-10-CM

## 2025-02-10 LAB
INFLUENZA A ANTIGEN, POC: POSITIVE
INFLUENZA B ANTIGEN, POC: NEGATIVE
LOT EXPIRE DATE: ABNORMAL
LOT KIT NUMBER: ABNORMAL
SARS-COV-2, POC: ABNORMAL
VALID INTERNAL CONTROL: PRESENT
VENDOR AND KIT NAME POC: ABNORMAL

## 2025-02-10 PROCEDURE — 87428 SARSCOV & INF VIR A&B AG IA: CPT

## 2025-02-10 PROCEDURE — 99212 OFFICE O/P EST SF 10 MIN: CPT

## 2025-02-10 PROCEDURE — 99213 OFFICE O/P EST LOW 20 MIN: CPT

## 2025-02-10 RX ORDER — OSELTAMIVIR PHOSPHATE 75 MG/1
75 CAPSULE ORAL 2 TIMES DAILY
Qty: 10 CAPSULE | Refills: 0 | Status: SHIPPED | OUTPATIENT
Start: 2025-02-10 | End: 2025-02-15

## 2025-02-10 SDOH — ECONOMIC STABILITY: FOOD INSECURITY: WITHIN THE PAST 12 MONTHS, YOU WORRIED THAT YOUR FOOD WOULD RUN OUT BEFORE YOU GOT MONEY TO BUY MORE.: NEVER TRUE

## 2025-02-10 SDOH — ECONOMIC STABILITY: FOOD INSECURITY: WITHIN THE PAST 12 MONTHS, THE FOOD YOU BOUGHT JUST DIDN'T LAST AND YOU DIDN'T HAVE MONEY TO GET MORE.: NEVER TRUE

## 2025-02-10 ASSESSMENT — PATIENT HEALTH QUESTIONNAIRE - PHQ9
SUM OF ALL RESPONSES TO PHQ QUESTIONS 1-9: 0
SUM OF ALL RESPONSES TO PHQ QUESTIONS 1-9: 0
SUM OF ALL RESPONSES TO PHQ9 QUESTIONS 1 & 2: 0
SUM OF ALL RESPONSES TO PHQ QUESTIONS 1-9: 0
1. LITTLE INTEREST OR PLEASURE IN DOING THINGS: NOT AT ALL
SUM OF ALL RESPONSES TO PHQ QUESTIONS 1-9: 0
2. FEELING DOWN, DEPRESSED OR HOPELESS: NOT AT ALL

## 2025-02-10 ASSESSMENT — ENCOUNTER SYMPTOMS
ABDOMINAL PAIN: 0
CONSTIPATION: 0
NAUSEA: 1
VOMITING: 0
SHORTNESS OF BREATH: 0
RHINORRHEA: 1
COUGH: 1
SINUS PAIN: 0
DIARRHEA: 0
SORE THROAT: 1
SINUS PRESSURE: 0

## 2025-02-10 NOTE — TELEPHONE ENCOUNTER
Patient calling in regards to getting an updated letter [facility]. Patient reports that the tingling has spread to his L and R Hands/fingers and reports shoulder weakness. Patient is requesting this to be added into the letter that was written before on 7/24/24.

## 2025-02-10 NOTE — PROGRESS NOTES
[facility]  [address] [place] [address]  Phone: [phone]  Fax: [phone]      Date of Visit:  2/10/2025  Patient Name: Adams Greer Jr.   Patient :  1975     HPI:     Adams Greer Jr. is a 49 y.o. male who presents today to discuss   Chief Complaint   Patient presents with    Influenza     Son just diagnosed this past Friday with \"flu\".  Sore throat, runny nose, chills.  OTC Tylenol cold/ Nyquil.  Productive cough (green and yellowish phlegm). Symptoms started on Saturday.  Did not get the flu or Covid vaccine this season.    This is a 49-year-old male with past medical history of hyperlipidemia, seasonal allergies, anxiety, chronic back pain who presents for an acute visit.    Patient is complaining of sore throat, rhinorrhea, chills, sweats, myalgia, occasional nausea and productive cough onset Saturday morning. No fevers, chest pain, shortness of breath, abdominal, vomiting, or diarrhea.     Has been using over-the-counter NyQuil, Tylenol severe cold, Robitussin for symptom relief.  Does not typically get the flu shot yearly.    Sick contact: Son recently diagnosed with the flu 3 days ago. Got Tamiflu.    I personally reviewed the patient's past medical history, current medications, allergies, surgical history, family history and social history.  Updates were made as necessary.    REVIEW OF SYSTEM      Review of Systems   Constitutional:  Positive for chills. Negative for fever.   HENT:  Positive for congestion, rhinorrhea and sore throat. Negative for sinus pressure and sinus pain.    Respiratory:  Positive for cough. Negative for shortness of breath.    Cardiovascular:  Negative for chest pain.   Gastrointestinal:  Positive for nausea. Negative for abdominal pain, constipation, diarrhea and vomiting.   Musculoskeletal:  Positive for myalgias.   Neurological:  Negative for dizziness and weakness.       REVIEWED

## 2025-02-10 NOTE — PATIENT INSTRUCTIONS
Thank you for following up with us at [facility] outpatient residency clinic! It was a pleasure to meet you today!     Our plan is the following:  - You tested positive for influenza A.  I have sent in Tamiflu to your pharmacy.  Please pick this up when as soon as possible.  I would strongly recommend getting the influenza vaccine after this illness resolves to decrease your risk of ending influenza B.  - If your symptoms start to improve but then suddenly worsen, please make an appointment to be seen  - For your cough you can use dextromethorphan (cough suppressant), cough drops, Cepacol lozenges.  Tylenol and/or ibuprofen for fevers and pain.  Steam inhalation and Zyrtec or Allegra for nasal congestion.    If you have any additional questions or concerns, please call the office ([phone]) and speak to one of the staff. They will triage and forward the message to the doctors! Have a great rest of your day!

## 2025-02-11 NOTE — TELEPHONE ENCOUNTER
Dear Dr. Palmer,    Unfortunately, I cannot update his letter as we did not discuss this during his most recent acute care visit for the flu. I cannot attest to his symptoms and did not perform any examination of his extremities.

## 2025-02-14 NOTE — PROGRESS NOTES
at all   Food Insecurity: No Food Insecurity (2/10/2025)    Hunger Vital Sign     Worried About Running Out of Food in the Last Year: Never true     Ran Out of Food in the Last Year: Never true   Transportation Needs: No Transportation Needs (2/10/2025)    PRAPARE - Transportation     Lack of Transportation (Medical): No     Lack of Transportation (Non-Medical): No   Intimate Partner Violence: Unknown (1/10/2025)    Received from [facility]    Humiliation, Afraid, Rape, and Kick questionnaire     Fear of Current or Ex-Partner: No   Housing Stability: Low Risk  (2/10/2025)    Housing Stability Vital Sign     Unable to Pay for Housing in the Last Year: No     Number of Times Moved in the Last Year: 0     Homeless in the Last Year: No        Objective:      /76   Pulse 77   Temp 97.8 °F (36.6 °C)   Resp 16   Ht 1.702 m (5' 7\")   Wt 71.2 kg (157 lb)   BMI 24.59 kg/m²    BP Readings from Last 3 Encounters:   02/17/25 131/76   02/10/25 121/75   07/22/24 (!) 145/93     Last Weight Metrics:      2/17/2025     9:05 AM 2/10/2025     9:00 AM 7/22/2024     9:24 AM 4/16/2024    12:56 PM 10/26/2023     8:33 AM 9/21/2023     9:37 AM 7/20/2022     9:44 AM   Weight Loss Metrics   Height 5' 7\"  5' 7\"  5' 7\" 5' 7\" 5' 7\"   Weight - Scale 157 lbs 167 lbs 10 oz 164 lbs 173 lbs 158 lbs 161 lbs 148 lbs   BMI (Calculated) 24.6 kg/m2 0 kg/m2 25.7 kg/m2 0 kg/m2 24.8 kg/m2 25.3 kg/m2 23.2 kg/m2        Physical Exam  Physical Exam  Vitals and nursing note reviewed.   Constitutional:       General: He is not in acute distress.     Appearance: Normal appearance. He is not ill-appearing.   HENT:      Right Ear: External ear normal.      Left Ear: External ear normal.      Mouth/Throat:      Mouth: Mucous membranes are moist.      Pharynx: No oropharyngeal exudate or posterior oropharyngeal erythema.   Eyes:      Extraocular Movements: Extraocular movements intact.      Pupils: Pupils are equal, round, and reactive to

## 2025-02-17 ENCOUNTER — OFFICE VISIT (OUTPATIENT)
Age: 50
End: 2025-02-17
Payer: COMMERCIAL

## 2025-02-17 VITALS
TEMPERATURE: 97.8 F | WEIGHT: 157 LBS | HEART RATE: 77 BPM | RESPIRATION RATE: 16 BRPM | HEIGHT: 67 IN | BODY MASS INDEX: 24.64 KG/M2 | SYSTOLIC BLOOD PRESSURE: 131 MMHG | DIASTOLIC BLOOD PRESSURE: 76 MMHG

## 2025-02-17 DIAGNOSIS — M51.360 DEGENERATION OF INTERVERTEBRAL DISC OF LUMBAR REGION WITH DISCOGENIC BACK PAIN: ICD-10-CM

## 2025-02-17 DIAGNOSIS — M51.362 DEGENERATION OF INTERVERTEBRAL DISC OF LUMBAR REGION WITH DISCOGENIC BACK PAIN AND LOWER EXTREMITY PAIN: Primary | ICD-10-CM

## 2025-02-17 PROCEDURE — 99213 OFFICE O/P EST LOW 20 MIN: CPT | Performed by: FAMILY MEDICINE

## 2025-02-17 PROCEDURE — 99212 OFFICE O/P EST SF 10 MIN: CPT | Performed by: FAMILY MEDICINE

## 2025-02-17 RX ORDER — CYCLOBENZAPRINE HCL 10 MG
TABLET ORAL
Qty: 21 TABLET | Refills: 1 | Status: SHIPPED | OUTPATIENT
Start: 2025-02-17

## 2025-02-17 NOTE — PATIENT INSTRUCTIONS
It was good to see you.  We did note that there is some weakness in the upper extremities.  Did rule out circulatory causes this is much more likely nerve related as you know.    I will see you back in July for well man visit.    Patient Education        A Healthy Lifestyle: Care Instructions  A healthy lifestyle can help you feel good, have more energy, and stay at a weight that's healthy for you. You can share a healthy lifestyle with your friends and family. And you can do it on your own.    Eat meals with your friends or family. You could try cooking together.   Plan activities with other people. Go for a walk with a friend, try a free online fitness class, or join a sports league.     Eat a variety of healthy foods. These include fruits, vegetables, whole grains, low-fat dairy, and lean protein.   Choose healthy portions of food. You can use the Nutrition Facts label on food packages as a guide.     Eat more fruits and vegetables. You could add vegetables to sandwiches or add fruit to cereal.   Drink water when you are thirsty. Limit soda, juice, and sports drinks.     Try to exercise most days. Aim for at least 2½ hours of exercise each week.   Keep moving. Work in the garden or take your dog on a walk. Use the stairs instead of the elevator.     If you use tobacco or nicotine, try to quit. Ask your doctor about programs and medicines to help you quit.   Limit alcohol. Men should have no more than 2 drinks a day. Women should have no more than 1. For some people, no alcohol is the best choice.   Follow-up care is a key part of your treatment and safety. Be sure to make and go to all appointments, and call your doctor if you are having problems. It's also a good idea to know your test results and keep a list of the medicines you take.  Where can you learn more?  Go to https://www.healthwise.net/patientEd and enter U807 to learn more about \"A Healthy Lifestyle: Care Instructions.\"  Current as of: April 30,

## 2025-05-04 DIAGNOSIS — F41.1 GENERALIZED ANXIETY DISORDER: ICD-10-CM

## 2025-05-05 RX ORDER — VENLAFAXINE HYDROCHLORIDE 75 MG/1
75 CAPSULE, EXTENDED RELEASE ORAL DAILY
Qty: 90 CAPSULE | Refills: 2 | Status: SHIPPED | OUTPATIENT
Start: 2025-05-05